# Patient Record
Sex: FEMALE | Race: WHITE | Employment: UNEMPLOYED | URBAN - NONMETROPOLITAN AREA
[De-identification: names, ages, dates, MRNs, and addresses within clinical notes are randomized per-mention and may not be internally consistent; named-entity substitution may affect disease eponyms.]

---

## 2020-02-28 ENCOUNTER — HOSPITAL ENCOUNTER (EMERGENCY)
Age: 25
Discharge: HOME | End: 2020-02-28
Payer: COMMERCIAL

## 2020-02-28 VITALS
DIASTOLIC BLOOD PRESSURE: 60 MMHG | RESPIRATION RATE: 16 BRPM | TEMPERATURE: 97.7 F | HEART RATE: 74 BPM | SYSTOLIC BLOOD PRESSURE: 110 MMHG | OXYGEN SATURATION: 100 %

## 2020-02-28 PROCEDURE — 99202 OFFICE O/P NEW SF 15 MIN: CPT | Performed by: NURSE PRACTITIONER

## 2020-02-28 PROCEDURE — 99202 OFFICE O/P NEW SF 15 MIN: CPT

## 2020-02-28 RX ORDER — ACETAMINOPHEN AND CODEINE PHOSPHATE 300; 30 MG/1; MG/1
1 TABLET ORAL EVERY 12 HOURS PRN
Qty: 4 TABLET | Refills: 0 | Status: SHIPPED | OUTPATIENT
Start: 2020-02-28 | End: 2020-03-02

## 2020-02-28 ASSESSMENT — PAIN SCALES - GENERAL: PAINLEVEL_OUTOF10: 9

## 2020-02-28 ASSESSMENT — PAIN DESCRIPTION - DESCRIPTORS: DESCRIPTORS: THROBBING

## 2020-02-28 ASSESSMENT — PAIN DESCRIPTION - ORIENTATION: ORIENTATION: RIGHT

## 2020-02-28 ASSESSMENT — PAIN DESCRIPTION - FREQUENCY: FREQUENCY: CONTINUOUS

## 2020-02-28 ASSESSMENT — PAIN DESCRIPTION - LOCATION: LOCATION: ELBOW

## 2020-02-28 NOTE — ED TRIAGE NOTES
Ambulatory to room 3 with boyfriend. Boyfriend talking and answers most of questions for patient. Pt seems fearful and flat in affect. Splint removed. Pt  Did state \" WIth simple everyday activities the pain will reoccur out of no where. \"  Ectoriennicole states \" The [place] doctor would only prescribe  This pain med that starts with M and its not working. The only  Thing that works for  Her is Tylenol #3. \"  Tita Sea on phone to nurses station asking about  Narcotic contracts [place] in  [place] in the urgent care.   He stated \" is there any way you can work with us on this and get her the medicine\"

## 2023-11-30 NOTE — ED PROVIDER NOTES
[id number]       Chief Complaint   Patient presents with    Elbow Pain     right pt wearing hinged splint. \" just came from doctor in [place] for elbow\"       Nurses Notes reviewed and I agree except as noted in the HPI. HISTORY OF PRESENT ILLNESS   Clement Garza is a 30 y.o. female who presents accompanied by her boyfriend. Patient is complaining of right elbow pain for the last month; worse in the last week. States pain is constant. Rates pain as 7/10 on the pain scale. Reports pain increases to 9/10 on the pain scale with touching or movement of the right arm. Patient states this episode of pain began about 1 month ago and is progressively worsening. She has had similar episodes in the past. Reports she injured the elbow approx. 9 years ago and since that time she has had 5 episodes of reoccurrence of pain. States this episode began after she was carrying some boxes. She does not recall any specific injury or trauma prior to this episode of pain beginning. The pain is localized to the right elbow. She has not had prior surgery for the problem. Reports in the past the only thing the helps is Tylenol #3 with codeine. She has tried other agents in the past without relief of symptoms. Reports she was seen in the ER in [place], where she resides, last Saturday. She was placed in a sling per her report and given Tramadol. The Tramadol doesn't help per her report. She saw her PCP today and was given Mobic 7.5mg which hasn't helped. Patient states she tried to call that office back to get a different pain medication but they were unable to write for anything different because she didn't sign a pain agreement at time of visit. Patient is requesting Tylenol #3 with codeine. She is traveling to [facility] for the weekend and will be back in [place] Sun night. She denies any numbness or tingling in the right arm/hand.      Refuses imaging  Refuses toradol or zanaflex  States the only thing that helps is Tylenol #3 with codeine    Spoke with PCP office: [facility]  [phone] Refugio Hdez)  -patient was seen this morning at 8a   -patient was seen for preventative exam and elbow pain  -given referral to ortho surgeon  -gave her mobic 7.5mg daily #20  -instructions to return if worse or no improvement  -concern for possible tear is noted in the exam notes  -reports patient has called a couple of times and boyfriend a couple of times. Reports both patient and her boyfriend was informed thee is a controlled substance agreement for the office and patient did not sign that and the PCP didn't want to try that and wrote the mobic  -this was her first visit  -no h/o AOD on file  -patient was seen by the resident and attending agreed with treatment plan    Dk Zachery agreed to schedule patient for follow-up on Monday at [address] [place] [address] since patient has reported to this provider the Mobic is ineffective and is seeking treatment in the urgent care. REVIEW OF SYSTEMS     Review of Systems   Musculoskeletal: Positive for arthralgias and myalgias. Skin: Negative for rash and wound. Neurological: Negative for weakness and numbness. PAST MEDICAL HISTORY   History reviewed. No pertinent past medical history. SURGICAL HISTORY     Patient  has a past surgical history that includes Tonsillectomy. CURRENT MEDICATIONS       Previous Medications    MELOXICAM (MOBIC PO)    Take by mouth       ALLERGIES     Patient is is allergic to zyrtec [cetirizine]; ciprofloxacin hcl; mucinex [guaifenesin er]; suprax [cefixime]; and zithromax [azithromycin]. FAMILY HISTORY     Patient'sfamily history is not on file. SOCIAL HISTORY     Patient  reports that she has never smoked. She has never used smokeless tobacco. She reports previous alcohol use. She reports that she does not use drugs.     PHYSICAL EXAM     ED TRIAGE VITALS  BP: 110/60, Temp: 97.7 °F (36.5 °C), Pulse: 74, Resp: 16, SpO2: 100 %  Physical Exam  Vitals signs and nursing note reviewed. Constitutional:       General: She is awake. Appearance: Normal appearance. She is normal weight. Cardiovascular:      Rate and Rhythm: Normal rate and regular rhythm. Heart sounds: Normal heart sounds, S1 normal and S2 normal.   Pulmonary:      Effort: Pulmonary effort is normal.      Breath sounds: Normal breath sounds and air entry. Musculoskeletal:      Right elbow: She exhibits decreased range of motion and swelling. She exhibits no deformity. Tenderness found. Neurological:      Mental Status: She is alert. Psychiatric:         Behavior: Behavior is cooperative. DIAGNOSTIC RESULTS   Labs: No results found for this visit on 02/28/20. IMAGING:  No orders to display     URGENT CARE COURSE:     Vitals:    02/28/20 1434   BP: 110/60   Pulse: 74   Resp: 16   Temp: 97.7 °F (36.5 °C)   SpO2: 100%       Medications - No data to display  PROCEDURES:  None  FINALIMPRESSION      1. Elbow strain, right, initial encounter        DISPOSITION/PLAN   DISPOSITION Decision To Discharge 02/28/2020 03:18:39 PM    Patient will be discharged. She will be given a very limited supply of Tylenol with codeine No. 3.  [place] was reviewed and appropriate. Spoke with the primary care office patient saw this morning and confirmed her reported history. She was not given any controlled substances per the primary care office and OAR[place] report. Patient has very limited range of motion with the right elbow and is extremely tender with even light palpation of the right elbow. She has been strongly encouraged to follow-up with an orthopedic specialist as soon as she returns to her home in [place]. Reviewed signs and symptoms that require immediate emergency room evaluation and treatment. Patient voiced understanding of all information and is agreeable to the treatment plan.     Controlled Substance Monitoring:    Acute and Chronic Pain Monitoring:   RX Monitoring 2/28/2020   Periodic Controlled Substance Monitoring No signs of potential drug abuse or diversion identified. ;Possible medication side effects, risk of tolerance/dependence & alternative treatments discussed. PATIENT REFERRED TO:  [facility]  [phone]    Appointment scheduled for Monday, March 2, 2020 at 09:00am.    DISCHARGE MEDICATIONS:  New Prescriptions    ACETAMINOPHEN-CODEINE (TYLENOL/CODEINE #3) 300-30 MG PER TABLET    Take 1 tablet by mouth every 12 hours as needed for Pain for up to 3 days. Intended supply: 3 days.  Take lowest dose possible to manage pain     Current Discharge Medication List          ROGER Gonzalez - ROGER Mccall CNP  03/01/20 2762 Spontaneous, unlabored and symmetrical